# Patient Record
Sex: MALE | Race: WHITE | NOT HISPANIC OR LATINO | URBAN - METROPOLITAN AREA
[De-identification: names, ages, dates, MRNs, and addresses within clinical notes are randomized per-mention and may not be internally consistent; named-entity substitution may affect disease eponyms.]

---

## 2023-09-25 ENCOUNTER — ON CAMPUS - OUTPATIENT (OUTPATIENT)
Dept: URBAN - METROPOLITAN AREA HOSPITAL 81 | Facility: HOSPITAL | Age: 73
End: 2023-09-25

## 2023-09-25 DIAGNOSIS — K57.90 DIVERTICULOSIS OF INTESTINE, PART UNSPECIFIED, WITHOUT PERFO: ICD-10-CM

## 2023-09-25 DIAGNOSIS — R10.32 LEFT LOWER QUADRANT PAIN: ICD-10-CM

## 2023-09-25 DIAGNOSIS — K57.32 DIVERTICULITIS OF LARGE INTESTINE WITHOUT PERFORATION OR ABS: ICD-10-CM

## 2023-09-25 DIAGNOSIS — Z79.1 LONG TERM (CURRENT) USE OF NON-STEROIDAL ANTI-INFLAMMATORIES: ICD-10-CM

## 2023-09-25 PROCEDURE — 99204 OFFICE O/P NEW MOD 45 MIN: CPT | Performed by: STUDENT IN AN ORGANIZED HEALTH CARE EDUCATION/TRAINING PROGRAM

## 2023-09-26 ENCOUNTER — INPATIENT HOSPITAL (OUTPATIENT)
Dept: URBAN - METROPOLITAN AREA HOSPITAL 81 | Facility: HOSPITAL | Age: 73
End: 2023-09-26
Payer: COMMERCIAL

## 2023-09-26 DIAGNOSIS — K57.90 DIVERTICULOSIS OF INTESTINE, PART UNSPECIFIED, WITHOUT PERFO: ICD-10-CM

## 2023-09-26 DIAGNOSIS — K57.32 DIVERTICULITIS OF LARGE INTESTINE WITHOUT PERFORATION OR ABS: ICD-10-CM

## 2023-09-26 DIAGNOSIS — R10.32 LEFT LOWER QUADRANT PAIN: ICD-10-CM

## 2023-09-26 DIAGNOSIS — Z79.1 LONG TERM (CURRENT) USE OF NON-STEROIDAL ANTI-INFLAMMATORIES: ICD-10-CM

## 2023-09-26 PROCEDURE — 99232 SBSQ HOSP IP/OBS MODERATE 35: CPT | Performed by: STUDENT IN AN ORGANIZED HEALTH CARE EDUCATION/TRAINING PROGRAM

## 2023-10-05 ENCOUNTER — OFFICE (OUTPATIENT)
Dept: URBAN - METROPOLITAN AREA CLINIC 11 | Facility: CLINIC | Age: 73
End: 2023-10-05

## 2023-10-05 VITALS
HEART RATE: 70 BPM | OXYGEN SATURATION: 97 % | HEIGHT: 74 IN | DIASTOLIC BLOOD PRESSURE: 87 MMHG | WEIGHT: 264 LBS | SYSTOLIC BLOOD PRESSURE: 155 MMHG

## 2023-10-05 DIAGNOSIS — E66.1 DRUG-INDUCED OBESITY: ICD-10-CM

## 2023-10-05 DIAGNOSIS — K57.40 DIVERTICULITIS OF BOTH SMALL AND LARGE INTESTINE WITH PERFOR: ICD-10-CM

## 2023-10-05 DIAGNOSIS — R10.32 LEFT LOWER QUADRANT PAIN: ICD-10-CM

## 2023-10-05 PROCEDURE — 99204 OFFICE O/P NEW MOD 45 MIN: CPT | Performed by: STUDENT IN AN ORGANIZED HEALTH CARE EDUCATION/TRAINING PROGRAM

## 2023-10-05 NOTE — SERVICENOTES
the patient was seen by me in the hospital for persistent diverticulitis with intramural abscess formation.  It was not amenable to percutaneous drainage and so he was treated with a 2 week course of Zosyn and is currently still on it right now.  He is still having persistent left lower quadrant pain so I am concerned that his abscess may not be responding to antibiotic therapy.  We will have him come back to see me in 3 weeks that way he has time to finish his IV antibiotics and see if his symptoms resolve.  If symptoms are worse or no better by next visit then we will repeat his CT scan to further evaluate the abscess.  If his symptoms are improved then we may go ahead and schedule him for colonoscopy.  I discussed with the patient and his wife this plan and they are both in agreement.  Continue to avoid NSAIDs.  will provide him with a note today saying that he was hospitalized.

## 2023-10-05 NOTE — SERVICEHPINOTES
Mr. Dionicio Lyman is a  72-year-old male with past medical history of hyperlipidemia, who presents to [facility] is a hospital follow-up for recent hospitalization for diverticulitis with intramural abscess formation.
[facility] clinic visit 10/05/2023: 
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  Patient had complicated diverticulitis with intramural abscess formation that was not amenable to percutaneous drainage.  Infectious Disease was consulted and the patient was put on IV antibiotics for 2 weeks and is currently still on them.  He reports persistent left lower quadrant pain that is intermittent throughout the day.  His bowels are moving without difficulty, he is afebrile and no nausea vomiting.  He has no blood in the stool or black tarry stool.  He is here with his wife today.  He has a very active job so he has not been able to work.  He will complete his antibiotics next week sometime.  No recent colonoscopy.